# Patient Record
Sex: MALE | Race: WHITE | NOT HISPANIC OR LATINO | URBAN - METROPOLITAN AREA
[De-identification: names, ages, dates, MRNs, and addresses within clinical notes are randomized per-mention and may not be internally consistent; named-entity substitution may affect disease eponyms.]

---

## 2022-05-23 ENCOUNTER — OFFICE VISIT (OUTPATIENT)
Dept: URBAN - METROPOLITAN AREA CLINIC 48 | Facility: CLINIC | Age: 67
End: 2022-05-23
Payer: MEDICARE

## 2022-05-23 VITALS
WEIGHT: 315 LBS | HEIGHT: 70 IN | TEMPERATURE: 97.3 F | DIASTOLIC BLOOD PRESSURE: 77 MMHG | SYSTOLIC BLOOD PRESSURE: 128 MMHG | BODY MASS INDEX: 45.1 KG/M2 | HEART RATE: 64 BPM

## 2022-05-23 DIAGNOSIS — R74.8 ELEVATED LIVER ENZYMES: ICD-10-CM

## 2022-05-23 PROCEDURE — 99204 OFFICE O/P NEW MOD 45 MIN: CPT | Performed by: INTERNAL MEDICINE

## 2022-05-23 RX ORDER — POTASSIUM CHLORIDE 1500 MG/1
TABLET, EXTENDED RELEASE ORAL
Qty: 30 | COMMUNITY

## 2022-05-23 RX ORDER — LISINOPRIL 2.5 MG/1
TABLET ORAL
Qty: 30 | COMMUNITY

## 2022-05-23 RX ORDER — MEXILETINE HYDROCHLORIDE 150 MG/1
CAPSULE ORAL
Qty: 360 | COMMUNITY

## 2022-05-23 RX ORDER — METFORMIN HYDROCHLORIDE 500 MG/1
TABLET, FILM COATED ORAL
Qty: 90 | COMMUNITY

## 2022-05-23 RX ORDER — EMPAGLIFLOZIN 10 MG/1
TABLET, FILM COATED ORAL
Qty: 30 | COMMUNITY

## 2022-05-23 RX ORDER — PRAVASTATIN SODIUM 40 MG/1
TABLET ORAL
Qty: 90 | COMMUNITY

## 2022-05-23 RX ORDER — FENOFIBRATE 145 MG/1
TABLET ORAL
Qty: 90 | COMMUNITY

## 2022-05-23 RX ORDER — METOPROLOL SUCCINATE 25 MG/1
TABLET, EXTENDED RELEASE ORAL
Qty: 90 | COMMUNITY

## 2022-05-23 RX ORDER — TORSEMIDE 20 MG/1
TABLET ORAL
Qty: 270 | COMMUNITY

## 2022-05-23 RX ORDER — SPIRONOLACTONE 25 MG/1
TABLET, FILM COATED ORAL
Qty: 90 | COMMUNITY

## 2022-05-23 RX ORDER — AMIODARONE HYDROCHLORIDE 200 MG/1
TABLET ORAL
Qty: 180 | COMMUNITY

## 2022-05-23 RX ORDER — APIXABAN 5 MG/1
TABLET, FILM COATED ORAL
Qty: 180 | COMMUNITY

## 2022-05-23 NOTE — HPI-TODAY'S VISIT:
68 yo M presents for a new OV to discuss abnormal liver function tests. The patient had labs on 5/22 that revealed an , AST 89, normal T bilirubin, acute hepatitis panel and platelets. The patient denies alcohol or Tylenol use.  Denies hepatitis exposure, fever or medication changes. In addition, the patient mentions Covid-19 exposure in 2019. History of defibrillator, DM(Ha1c <6), HLD and HTN.

## 2022-05-23 NOTE — PHYSICAL EXAM HENT:
Head,  normocephalic,  atraumatic,  Face,  Face within normal limits,  Ears,  External ears within normal limits,  Nose/Nasopharynx,  External nose  normal appearance,  nares patent,  no nasal discharge,  Mouth and Throat,  Oral cavity appearance normal,  Breath odor normal,  Lips,  Appearance normal Name band;

## 2022-07-16 LAB
A/G RATIO: 1.2
ALBUMIN: 3.8
ALKALINE PHOSPHATASE: 60
ALT (SGPT): 122
AST (SGOT): 79
BILIRUBIN, TOTAL: 0.5
BUN/CREATININE RATIO: 26
BUN: 36
CALCIUM: 10.2
CARBON DIOXIDE, TOTAL: 22
CHLORIDE: 105
CREATININE: 1.4
EGFR: 55
GLOBULIN, TOTAL: 3.1
GLUCOSE: 81
HBSAG SCREEN: NEGATIVE
HCV AB: <0.1
HEP A AB, IGM: NEGATIVE
HEP B CORE AB, IGM: NEGATIVE
INTERPRETATION:: (no result)
POTASSIUM: 4
PROTEIN, TOTAL: 6.9
SODIUM: 140

## 2022-08-15 ENCOUNTER — TELEPHONE ENCOUNTER (OUTPATIENT)
Dept: URBAN - METROPOLITAN AREA CLINIC 48 | Facility: CLINIC | Age: 67
End: 2022-08-15

## 2022-10-31 ENCOUNTER — OFFICE VISIT (OUTPATIENT)
Dept: URBAN - METROPOLITAN AREA CLINIC 48 | Facility: CLINIC | Age: 67
End: 2022-10-31
Payer: MEDICARE

## 2022-10-31 VITALS
HEIGHT: 70 IN | TEMPERATURE: 97.2 F | HEART RATE: 65 BPM | SYSTOLIC BLOOD PRESSURE: 116 MMHG | BODY MASS INDEX: 44.87 KG/M2 | WEIGHT: 313.4 LBS | DIASTOLIC BLOOD PRESSURE: 72 MMHG

## 2022-10-31 DIAGNOSIS — R74.8 ELEVATED LIVER ENZYMES: ICD-10-CM

## 2022-10-31 PROCEDURE — 99213 OFFICE O/P EST LOW 20 MIN: CPT | Performed by: INTERNAL MEDICINE

## 2022-10-31 RX ORDER — AMIODARONE HYDROCHLORIDE 200 MG/1
1 TABLET TABLET ORAL ONCE A DAY
Qty: 90 TABLET | Status: ACTIVE | COMMUNITY

## 2022-10-31 RX ORDER — EMPAGLIFLOZIN 10 MG/1
1 TABLET TABLET, FILM COATED ORAL ONCE A DAY
Qty: 30 TABLET | Status: ACTIVE | COMMUNITY

## 2022-10-31 RX ORDER — METFORMIN HYDROCHLORIDE 500 MG/1
1 TABLET WITH A MEAL TABLET, FILM COATED ORAL ONCE A DAY
Qty: 90 TABLET | Status: ACTIVE | COMMUNITY

## 2022-10-31 RX ORDER — METOPROLOL SUCCINATE 25 MG/1
TABLET, EXTENDED RELEASE ORAL
Qty: 90 | Status: ACTIVE | COMMUNITY

## 2022-10-31 RX ORDER — APIXABAN 5 MG/1
1 TABLET TABLET, FILM COATED ORAL TWICE A DAY
Qty: 180 TABLET | Status: ACTIVE | COMMUNITY

## 2022-10-31 RX ORDER — LISINOPRIL 2.5 MG/1
1 TABLET TABLET ORAL ONCE A DAY
Qty: 30 TABLET | Status: ACTIVE | COMMUNITY

## 2022-10-31 RX ORDER — SPIRONOLACTONE 25 MG/1
1 TABLET TABLET, FILM COATED ORAL ONCE A DAY
Qty: 90 TABLET | Status: ACTIVE | COMMUNITY

## 2022-10-31 RX ORDER — POTASSIUM CHLORIDE 1500 MG/1
1 TABLET WITH FOOD TABLET, EXTENDED RELEASE ORAL ONCE A DAY
Qty: 30 TABLET | Status: ACTIVE | COMMUNITY

## 2022-10-31 RX ORDER — MEXILETINE HYDROCHLORIDE 150 MG/1
1 CAPSULE CAPSULE ORAL TWICE A DAY
Qty: 180 CAPSULE | Status: ACTIVE | COMMUNITY

## 2022-10-31 RX ORDER — TORSEMIDE 20 MG/1
1 TABLET TABLET ORAL TWICE A DAY
Qty: 180 TABLET | Status: ACTIVE | COMMUNITY

## 2022-10-31 NOTE — HPI-TODAY'S VISIT:
68 yo M presents for a new OV to discuss abnormal liver function tests. The patient had labs on 5/22 that revealed an , AST 89, normal T bilirubin, acute hepatitis panel and platelets. The patient denies alcohol or Tylenol use.  Denies hepatitis exposure, fever or medication changes. In addition, the patient mentions Covid-19 exposure in 2019. History of defibrillator, DM(Ha1c <6), HLD and HTN. We repeated AST and ALT which showed improvement in both.   Follow up labs reveal a decrease in liver enzymes. , AST 79. He was recently fenofibrate, statin and fish oil.

## 2023-04-12 ENCOUNTER — DASHBOARD ENCOUNTERS (OUTPATIENT)
Age: 68
End: 2023-04-12

## 2023-04-24 ENCOUNTER — OFFICE VISIT (OUTPATIENT)
Dept: URBAN - METROPOLITAN AREA CLINIC 48 | Facility: CLINIC | Age: 68
End: 2023-04-24

## 2023-04-24 RX ORDER — MEXILETINE HYDROCHLORIDE 150 MG/1
1 CAPSULE CAPSULE ORAL TWICE A DAY
Qty: 180 CAPSULE | Status: ACTIVE | COMMUNITY

## 2023-04-24 RX ORDER — POTASSIUM CHLORIDE 1500 MG/1
1 TABLET WITH FOOD TABLET, EXTENDED RELEASE ORAL ONCE A DAY
Qty: 30 TABLET | Status: ACTIVE | COMMUNITY

## 2023-04-24 RX ORDER — TORSEMIDE 20 MG/1
1 TABLET TABLET ORAL TWICE A DAY
Qty: 180 TABLET | Status: ACTIVE | COMMUNITY

## 2023-04-24 RX ORDER — METFORMIN HYDROCHLORIDE 500 MG/1
1 TABLET WITH A MEAL TABLET, FILM COATED ORAL ONCE A DAY
Qty: 90 TABLET | Status: ACTIVE | COMMUNITY

## 2023-04-24 RX ORDER — SPIRONOLACTONE 25 MG/1
1 TABLET TABLET, FILM COATED ORAL ONCE A DAY
Qty: 90 TABLET | Status: ACTIVE | COMMUNITY

## 2023-04-24 RX ORDER — CLINDAMYCIN HYDROCHLORIDE 300 MG/1
1 CAPSULE CAPSULE ORAL
Qty: 20 CAPSULE | Status: ACTIVE | COMMUNITY

## 2023-04-24 RX ORDER — EMPAGLIFLOZIN 10 MG/1
1 TABLET TABLET, FILM COATED ORAL ONCE A DAY
Qty: 30 TABLET | Status: ACTIVE | COMMUNITY

## 2023-04-24 RX ORDER — METHIMAZOLE 10 MG/1
1 TABLET TABLET ORAL ONCE A DAY
Qty: 90 TABLET | Status: ACTIVE | COMMUNITY

## 2023-04-24 RX ORDER — AMIODARONE HYDROCHLORIDE 200 MG/1
1 TABLET TABLET ORAL ONCE A DAY
Qty: 90 TABLET | Status: ACTIVE | COMMUNITY

## 2023-04-24 RX ORDER — APIXABAN 5 MG/1
1 TABLET TABLET, FILM COATED ORAL TWICE A DAY
Qty: 180 TABLET | Status: ACTIVE | COMMUNITY

## 2023-04-24 RX ORDER — LISINOPRIL 2.5 MG/1
1 TABLET TABLET ORAL ONCE A DAY
Qty: 30 TABLET | Status: ACTIVE | COMMUNITY

## 2023-04-24 RX ORDER — METOPROLOL SUCCINATE 25 MG/1
TABLET, EXTENDED RELEASE ORAL
Qty: 90 | Status: ACTIVE | COMMUNITY